# Patient Record
Sex: MALE | Race: WHITE
[De-identification: names, ages, dates, MRNs, and addresses within clinical notes are randomized per-mention and may not be internally consistent; named-entity substitution may affect disease eponyms.]

---

## 2018-11-27 ENCOUNTER — HOSPITAL ENCOUNTER (INPATIENT)
Dept: HOSPITAL 25 - ED | Age: 63
LOS: 1 days | Discharge: ANOTHER HOSPITAL | DRG: 191 | End: 2018-11-28
Attending: INTERNAL MEDICINE | Admitting: INTERNAL MEDICINE
Payer: COMMERCIAL

## 2018-11-27 DIAGNOSIS — F17.210: ICD-10-CM

## 2018-11-27 DIAGNOSIS — I11.9: Primary | ICD-10-CM

## 2018-11-27 DIAGNOSIS — I25.10: ICD-10-CM

## 2018-11-27 DIAGNOSIS — E87.6: ICD-10-CM

## 2018-11-27 DIAGNOSIS — Z91.14: ICD-10-CM

## 2018-11-27 DIAGNOSIS — Z82.49: ICD-10-CM

## 2018-11-27 DIAGNOSIS — Z95.5: ICD-10-CM

## 2018-11-27 DIAGNOSIS — I21.4: ICD-10-CM

## 2018-11-27 DIAGNOSIS — E78.5: ICD-10-CM

## 2018-11-27 DIAGNOSIS — R91.1: ICD-10-CM

## 2018-11-27 DIAGNOSIS — Z83.3: ICD-10-CM

## 2018-11-27 LAB
BASOPHILS # BLD AUTO: 0.1 10^3/UL (ref 0–0.2)
EOSINOPHIL # BLD AUTO: 0.2 10^3/UL (ref 0–0.6)
HCT VFR BLD AUTO: 45 % (ref 42–52)
HGB BLD-MCNC: 15.3 G/DL (ref 14–18)
INR PPP/BLD: 0.95 (ref 0.77–1.02)
LYMPHOCYTES # BLD AUTO: 1.2 10^3/UL (ref 1–4.8)
MCH RBC QN AUTO: 32 PG (ref 27–31)
MCHC RBC AUTO-ENTMCNC: 34 G/DL (ref 31–36)
MCV RBC AUTO: 94 FL (ref 80–94)
MONOCYTES # BLD AUTO: 0.5 10^3/UL (ref 0–0.8)
NEUTROPHILS # BLD AUTO: 7.7 10^3/UL (ref 1.5–7.7)
NRBC # BLD AUTO: 0 10^3/UL
NRBC BLD QL AUTO: 0
PLATELET # BLD AUTO: 202 10^3/UL (ref 150–450)
RBC # BLD AUTO: 4.74 10^6/UL (ref 4–5.4)
WBC # BLD AUTO: 9.7 10^3/UL (ref 3.5–10.8)

## 2018-11-27 PROCEDURE — 85379 FIBRIN DEGRADATION QUANT: CPT

## 2018-11-27 PROCEDURE — 80061 LIPID PANEL: CPT

## 2018-11-27 PROCEDURE — 80053 COMPREHEN METABOLIC PANEL: CPT

## 2018-11-27 PROCEDURE — 85730 THROMBOPLASTIN TIME PARTIAL: CPT

## 2018-11-27 PROCEDURE — 82550 ASSAY OF CK (CPK): CPT

## 2018-11-27 PROCEDURE — 87641 MR-STAPH DNA AMP PROBE: CPT

## 2018-11-27 PROCEDURE — 85025 COMPLETE CBC W/AUTO DIFF WBC: CPT

## 2018-11-27 PROCEDURE — 83735 ASSAY OF MAGNESIUM: CPT

## 2018-11-27 PROCEDURE — 84484 ASSAY OF TROPONIN QUANT: CPT

## 2018-11-27 PROCEDURE — 93005 ELECTROCARDIOGRAM TRACING: CPT

## 2018-11-27 PROCEDURE — 99157 MOD SED OTHER PHYS/QHP EA: CPT

## 2018-11-27 PROCEDURE — 71275 CT ANGIOGRAPHY CHEST: CPT

## 2018-11-27 PROCEDURE — 80048 BASIC METABOLIC PNL TOTAL CA: CPT

## 2018-11-27 PROCEDURE — 83880 ASSAY OF NATRIURETIC PEPTIDE: CPT

## 2018-11-27 PROCEDURE — 81003 URINALYSIS AUTO W/O SCOPE: CPT

## 2018-11-27 PROCEDURE — 82553 CREATINE MB FRACTION: CPT

## 2018-11-27 PROCEDURE — 71045 X-RAY EXAM CHEST 1 VIEW: CPT

## 2018-11-27 PROCEDURE — 99156 MOD SED OTH PHYS/QHP 5/>YRS: CPT

## 2018-11-27 PROCEDURE — 83605 ASSAY OF LACTIC ACID: CPT

## 2018-11-27 PROCEDURE — 93458 L HRT ARTERY/VENTRICLE ANGIO: CPT

## 2018-11-27 PROCEDURE — 85610 PROTHROMBIN TIME: CPT

## 2018-11-27 PROCEDURE — 84443 ASSAY THYROID STIM HORMONE: CPT

## 2018-11-27 PROCEDURE — 99285 EMERGENCY DEPT VISIT HI MDM: CPT

## 2018-11-27 PROCEDURE — 36415 COLL VENOUS BLD VENIPUNCTURE: CPT

## 2018-11-27 PROCEDURE — 93306 TTE W/DOPPLER COMPLETE: CPT

## 2018-11-27 RX ADMIN — METOPROLOL TARTRATE SCH MG: 25 TABLET, FILM COATED ORAL at 20:55

## 2018-11-27 RX ADMIN — HEPARIN SODIUM PRN UNITS: 5000 INJECTION INTRAVENOUS; SUBCUTANEOUS at 18:38

## 2018-11-27 NOTE — ED
HPI Chest Pain





- HPI Summary


HPI Summary: 


The pt is a 64 y/[facility] and LUE pain since 1730 hrs 

yesterday. 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 

without any difficulty. The constant stabbing pain is rated 6/10 in severity at 

its worst and is different from his previous MI because he did not have CP with 

his previous MI. He had his first MI at the age 45-46 years and had a stent 

placed. He notes nausea, CP, L rib pain, a fall 1 month ago (landing on the L 

side) but denies HA, dizziness, and fever. He took 1 baby ASA to mild relief. 

The pt reports a PMHx of HLD (stopped medications months ago) but denies a hx 

of HTN and DM.  FHx  of epileptic seizures (mother). PShx of the kidneys and 

shoulders. Vital signs while in room: HR 78 bpm, /98. Pt states he 

changed his last name when he got , and his name used to be Art Gomez Jr. 





Home medications: Lipitor and Plavix which he has not taken because he did not 

want to take medications





- History of Current Complaint


Time Seen by Provider: 11/27/18 12:48


Hx Obtained From: Patient


Onset/Duration: Started Days Ago - 1 day, Still Present


Timing: Constant, Lasting Days


Initial Severity: Moderate - 6/10


Current Severity: Mild - CP=2/10, LUE pain= 3/10


Pain Intensity: 2


Pain Scale Used: 0-10 Numeric


Chest Pain Location: Discrete at: - L side


Chest Pain Radiates: Yes


Chest Pain Radiates To:: Arm - LUE


Character: Sharp/Stabbing


Aggravating Factor(s): Nothing


Alleviating Factor(s): OTC Meds - 1 baby ASA


Associated Signs and Symptoms: Positive: Negative - HA, dizziness, Chest Pain, 

Nausea, Other: - Fall 1 month ago.  Negative: Headaches, Fever





- Risk Factors


AMI/ACS Risk Factors: Myocardial Infarction, Smoking, Dyslipidemia





- Allergy/Home Medications


Allergies/Adverse Reactions: 


 Allergies











Allergy/AdvReac Type Severity Reaction Status Date / Time


 


No Known Allergies Allergy   Verified 11/27/18 12:57











Home Medications: 


 Home Medications





NK [No Home Medications Reported]  11/27/18 [History Confirmed 11/27/18]











PMH/Surg Hx/FS Hx/Imm Hx


Previously Healthy: No


Endocrine/Hematology History: 


   Denies: Hx Diabetes


Cardiovascular History: Reports: Hx Coronary Artery Disease, Hx 

Hypercholesterolemia, Hx Myocardial Infarction


   Denies: Hx Hypertension


Respiratory History: 


   Denies: Hx Asthma


 History: Reports: Other  Problems/Disorders - "kidney surgery"


Sensory History: 


   Denies: Hx Deafness





- Cancer History


Cancer Type, Location and Year: None reported





- Surgical History


Surgery Procedure, Year, and Place: Kidney and shoulder surgery, cardiac stent


Infectious Disease History: No





- Family History


Known Family History: Positive: Seizure Disorder - Mother 





- Social History


Occupation: Employed Full-time


Lives: With Family


Alcohol Use: Occasionally


Hx Substance Use: No


Smoking Status (MU): Heavy Every Day Tobacco Smoker





Review of Systems


Constitutional: Negative - Dizziness, Other - Positive: fall -1 month ago


Negative: Fever


Positive: Chest Pain


Respiratory: Negative


Positive: Nausea


Musculoskeletal: Other - Positive: L rib pain 


Skin: Negative


Negative: Headache


Psychological: Normal


All Other Systems Reviewed And Are Negative: Yes





Physical Exam





- Summary


Physical Exam Summary: 


Appearance: Ill-appearing, moderate pain distress, well-nourished


Skin: Warm, color reflects adequate perfusion, dry


Head: Normal Head/Face inspection, atraumatic


Eyes: Conjunctiva clear


ENT: Normal inspection


Neck: Supple, no nodes, no JVD


Respiratory: scattered wheezes, no respiratory distress, reproducible CP on his 

left anterior 10th rib


Cardio: RRR, No murmur, pulses normal, brisk capillary refill


Abdomen: Soft, nontender, no masses, nondistended


Bowel sounds: Present 


Musculoskeletal: Strength Intact/ROM intact, no calf tenderness, no edema.


Psychological: Normal


Neuro: Alert, muscle tone normal, no focal deficit


Triage Information Reviewed: Yes


Vital Signs On Initial Exam: 





 Initial Vital Signs











Pulse  79   11/27/18 12:49


 


Resp  23   11/27/18 12:49


 


Pulse Ox  100   11/27/18 12:49











Vital Signs Reviewed: Yes





Diagnostics





- Laboratory


Result Diagrams: 


 11/27/18 12:59





 11/27/18 12:59


Lab Statement: Any lab studies that have been ordered have been reviewed, and 

results considered in the medical decision making process.





- Radiology


  ** CXR


Radiology Interpretation Completed By: Radiologist - IMPRESSION: NO ACTIVE 

CARDIOPULMONARY DISEASE.The ED physician reviewed this radiology report.





- CT


  ** Chest/thorax CTA


CT Interpretation Completed By: Radiologist - IMPRESSION:  1.  NO PULMONARY 

ARTERIAL FILLING DEFECT TO SUGGEST PULMONARY EMBOLISM. 2.  4.2 CM RIGHT ADRENAL 

NODULE. IN THE ABSENCE OF A HISTORY OF MALIGNANCY, CONSIDER SURGICAL 

CONSULTATION FOR THE PRESENCE OF AN INCIDENTAL ADRENAL MASS GREATER THAN 4 CM 

IN SIZE. 3.  0.3 CM PLEURAL-BASED NODULE OF THE RIGHT MIDDLE LOBE. THE 

RECOMMENDATIONS FOR FOLLOWUP AND MANAGEMENT OF AN INCIDENTALLY DETECTED 

PULMONARY NODULE LESS THAN 6 MM IN SIZE, IN A PATIENT WITHOUT A HISTORY OF 

MALIGNANCY, INCLUDE NO FOLLOWUP FOR A LOW-RISK PATIENT OR OPTIONAL FOLLOWUP CT 

IN 12 MONTHS FOR A HIGH RISK PATIENT. 4.  CORONARY ARTERY DISEASE. The ED 

physician reviewed this radiology report.





- EKG


  ** 12:42


Cardiac Rate: NL - 74 bpm


EKG Rhythm: Sinus Rhythm


Ectopy: None


EKG Comparison: Other


Summary of EKG Findings: Normal IVCT, Normal QTC, Normal axis, ST depression in

  leads V4-V6.  No prior EKG to compare with.





  ** 14:13


Cardiac Rate: NL - 75 bpm


EKG Rhythm: Sinus Rhythm


Ectopy: None


Summary of EKG Findings: Normal IVCT, Normal QTC, Normal axis, ST segments have 

normalized compared to previous EKG performed today after NTG SL x1 and pain 

relieved.





Re-Evaluation





- Re-Evaluation


  ** First Eval


Re-Evaluation Time: 13:49


Change: Improved - The patient is pain free. His wife, daughter and 

granddaughter are with him at bedside. He agrees for further testing with a CTA.





  ** Second Eval


Re-Evaluation Time: 15:45


Change: Improved - Pt remains pain free. I discussed plan to admit with the pt 

and his family.





Chest Pain Course/Dx





- Course


Course Of Treatment: A 63 year-old M with hx MI and stent at age 48 presents to 

the ED with a CC of CP and LUE pain since 1730 hrs yesterday after lifting 

heavy scaffolding at work. Pt is a heavy smoker and discontinued lipitor and 

plaviix on his own because he did not want to take medication. Pt does not have 

a cardiologist.  A physical exam revealed scattered wheezes and reproducible 

pain in his 10th L rib. A CXR is unremarkable. A chest/thorax CTA reveals no 

pulmonary arterial filling defect to suggest PE, a 4.2 cm adrenal nodule, a 0.3 

cm pleural-based nodule in the R middle lobe and coronary artery disease. 

Initial EKG showed SR with 1-2 mm ST depression in leads V4-V6. A second EKG 

after one NTG SL which relieved CP shows normalized ST segements.  Pt's initial 

troponin was 0.01, 3 hr troponin was 0.65. In the ED course, pt was given ASA 

243 mg PO (since pt had taken 1 baby ASA prior to admission) and NTG 0.4 mg SL 

which improved the symptoms. I discussed the care of the pt with Dr. Harper 

hospitalist who agreed to admit the pt. Patient will be admitted with a final 

Dx of CP, non STEMI. Pt is agreeable with this plan. Allergies noted.





- Chest Pain


Differential Diagnosis/HQI/PQRI: Acute MI, ACS, Angina, Pulmonary Embolism





- Diagnoses


Provider Diagnoses: 


 Chest pain, Non-STEMI (non-ST elevated myocardial infarction)








- Provider Notifications


Discussed Care Of Patient With: Sheeba Harper - Hospitalist 


Time Discussed With Above Provider: 15:35


Instructed by Provider To: Admit As Inpatient





- Critical Care Time


Critical Care Time: 30-74 min - 30 mins





Discharge





- Sign-Out/Discharge


Documenting (check all that apply): Patient Departure - Admit 





- Discharge Plan


Condition: Stable


Disposition: ADMITTED TO [facility]





- Billing Disposition and Condition


Condition: STABLE


Disposition: Admitted to [facility]





- Attestation Statements


Document Initiated by Scribe: Yes


Documenting Scribe: Dawn Caraballo


Provider For Whom José Antonioibe is Documenting (Include Credential): Dr. Linda White MD 


Scribe Attestation: 


Dawn CERVANTES, scribed for Dr. Linda White MD  on 11/28/18 at 0158. 


Scribe Documentation Reviewed: Yes


Provider Attestation: 


The documentation as recorded by the Dawn branch accurately 

reflects the service I personally performed and the decisions made by me, Dr. Linda White MD 


Status of Scribe Document: Viewed

## 2018-11-28 VITALS — SYSTOLIC BLOOD PRESSURE: 96 MMHG | DIASTOLIC BLOOD PRESSURE: 60 MMHG

## 2018-11-28 LAB
BASOPHILS # BLD AUTO: 0 10^3/UL (ref 0–0.2)
EOSINOPHIL # BLD AUTO: 0.2 10^3/UL (ref 0–0.6)
HCT VFR BLD AUTO: 43 % (ref 42–52)
HGB BLD-MCNC: 14.7 G/DL (ref 14–18)
LYMPHOCYTES # BLD AUTO: 1.5 10^3/UL (ref 1–4.8)
MCH RBC QN AUTO: 32 PG (ref 27–31)
MCHC RBC AUTO-ENTMCNC: 35 G/DL (ref 31–36)
MCV RBC AUTO: 94 FL (ref 80–94)
MONOCYTES # BLD AUTO: 0.6 10^3/UL (ref 0–0.8)
NEUTROPHILS # BLD AUTO: 6.5 10^3/UL (ref 1.5–7.7)
NRBC # BLD AUTO: 0 10^3/UL
NRBC BLD QL AUTO: 0
PLATELET # BLD AUTO: 189 10^3/UL (ref 150–450)
RBC # BLD AUTO: 4.52 10^6/UL (ref 4–5.4)
WBC # BLD AUTO: 8.9 10^3/UL (ref 3.5–10.8)

## 2018-11-28 PROCEDURE — B215YZZ FLUOROSCOPY OF LEFT HEART USING OTHER CONTRAST: ICD-10-PCS | Performed by: INTERNAL MEDICINE

## 2018-11-28 PROCEDURE — 4A023N7 MEASUREMENT OF CARDIAC SAMPLING AND PRESSURE, LEFT HEART, PERCUTANEOUS APPROACH: ICD-10-PCS | Performed by: INTERNAL MEDICINE

## 2018-11-28 PROCEDURE — B211YZZ FLUOROSCOPY OF MULTIPLE CORONARY ARTERIES USING OTHER CONTRAST: ICD-10-PCS | Performed by: INTERNAL MEDICINE

## 2018-11-28 RX ADMIN — HEPARIN SODIUM PRN UNITS: 5000 INJECTION INTRAVENOUS; SUBCUTANEOUS at 00:58

## 2018-11-28 RX ADMIN — METOPROLOL TARTRATE SCH MG: 25 TABLET, FILM COATED ORAL at 08:44

## 2018-11-28 NOTE — HP
CC:  Dr. Skiff

 

HISTORY AND PHYSICAL:

 

ADDENDUM:

 

ASSESSMENT AND PLAN:

1.  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 
units at 29.  At this point, surgical evaluation is recommended.  Due to that 
that it was incidentally found and the patient has no symptoms of adrenal 
adenoma, the patient is going to be referred to Dr. Mckinley, the surgical 
endocrinology specialist at discharge.

2.  In regards to the patient's 3-mm lung nodule, followup recommended with CAT 
scan in 3 to 6 months to document stability.

 

 

 

[id number]/[facility] #: [id number]

[facility] Center[facility]IANA

## 2018-11-28 NOTE — TRS
CC:  Dr. Story; Dr. Conklin; Dr. Elder; Dr. Skiff *

 

DATE OF ADMISSION:  11/27/2018.

 

DATE OF ANTICIPATED TRANSFER:  11/28/2018.

 

PRIMARY CARE PHYSICIAN:  Dr. Skiff.

 

DISPOSITION:  The patient is being transferred to [facility] in 
[place], [place] under the care of Dr. Elder.

 

REASON FOR TRANSFER:  Severe three vessel carotid disease.  The patient is 
going there for consideration of coronary artery bypass grafting.

 

MEDICATIONS AT THE TIME OF TRANSFER:

1.  Normal saline 75 ml/hour.

2.  Metoprolol Tartrate 25 mg p.o. every 6 hours.

3.  Aspirin 325 mg daily.

4.  Nitroglycerin 0.4 mg on a prn basis for chest pain.

 

LABORATORY DATA:  The most recent laboratory data from 11/28/2018 showed sodium 
138, potassium 3.8, chloride 107, carbon dioxide 25, BUN 14, creatinine 0.96. 
Troponin was obtained at 6:00 a.m. on 11/28/2018 and was 5.1.  The patient's 
fasting liver profile shows triglycerides of 120, cholesterol 227, , HDL 
40. TSH was noted to be 4.4 on admission.  CBC:  WBC 8.9, hemoglobin 14.7, 
hematocrit 43, platelets 189.

 

PROCEDURES PERFORMED:  Cardiac catheterization performing in the morning on 11/
28/2018 by Dr. Story which as per verbal report from Dr. Story showed severe 
three vessel coronary artery disease 



HOSPITALIZATION COURSE:  Art Werner is a 63-year-old male with a history of 
smoking over a pack a day who had coronary artery disease with stenting in 2002 
and had not been taking his medications, including Plavix, aspirin, and Lipitor 
for "a while" who presented complaining of exertional chest pain.  His troponin 
continues to increase throughout his hospital stay.  He had developed chest 
pain when he was still observed in the emergency room and it was recurrent.  
His EKG showed minimal ST elevation and negative T-waves which was new.  On the 
morning on 11/28/2018, his T-waves progressed to be more negative.  Dr. Story 
saw the patient in consultation and performed cardiac catheterization which 
showed severe three vessel coronary disease.  The patient wished to be 
transferred back to [facility] where he received his stents in 2002 
for further management of likely cardiothoracic surgery.

 

PHYSICAL EXAMINATION AT DISCHARGE:  Noted in the progress notes.

 

Please note that Dr. Elder from [facility] was kind enough to 
accept the patient in consideration for cardiothoracic surgery at [facility].

 

Please note, this is a short summary of the patient's hospitalization.  Please 
refer to further medical records for details.

 

Approximately 40 minutes were spent on this patient's discharge.

 

 [id number]/[facility] #: [id number]

ADONIS

## 2018-11-28 NOTE — PN
Subjective


Date of Service: 11/28/18


Interval History: 





Pt is preparing for cath today. Denies CP





Objective


Active Medications: 








Acetaminophen (Tylenol Tab*)  650 mg PO Q4H PRN


   PRN Reason: FEVER/PAIN


Aspirin (Ecotrin Ec Tab*)  325 mg PO DAILY [facility]


   Last Admin: 11/28/18 08:44 Dose:  325 mg


Sodium Chloride (Ns 0.9% 1000 Ml*)  1,000 mls @ 100 mls/hr IV .per rate [facility]


Metoprolol Tartrate (Lopressor Tab*)  25 mg PO Q12HR [facility]


   Last Admin: 11/28/18 08:44 Dose:  25 mg


Morphine Sulfate (Morphine Vial*)  2 mg IV Q4H PRN


   PRN Reason: PAIN - MILD








 Vital Signs - 8 hr











  11/28/18 11/28/18 11/28/18





  03:00 03:01 04:00


 


Temperature   98.4 F


 


Pulse Rate 56 55 64


 


Respiratory 15 15 21





Rate   


 


Blood Pressure 110/74  122/94





(mmHg)   


 


O2 Sat by Pulse 94 95 98





Oximetry   














  11/28/18 11/28/18 11/28/18





  04:01 05:00 05:01


 


Temperature   


 


Pulse Rate 61 57 56


 


Respiratory 19 16 14





Rate   


 


Blood Pressure  106/70 





(mmHg)   


 


O2 Sat by Pulse 97 95 95





Oximetry   














  11/28/18 11/28/18 11/28/18





  05:50 06:00 06:01


 


Temperature   


 


Pulse Rate  67 71


 


Respiratory 15 17 16





Rate   


 


Blood Pressure  110/67 





(mmHg)   


 


O2 Sat by Pulse  95 97





Oximetry   














  11/28/18 11/28/18 11/28/18





  07:00 07:01 08:00


 


Temperature   


 


Pulse Rate 53 56 53


 


Respiratory 15 14 17





Rate   


 


Blood Pressure 113/75  109/71





(mmHg)   


 


O2 Sat by Pulse 96 96 95





Oximetry   














  11/28/18 11/28/18 11/28/18





  08:01 08:19 09:00


 


Temperature  97.6 F 


 


Pulse Rate 57  69


 


Respiratory 17  23





Rate   


 


Blood Pressure   134/83





(mmHg)   


 


O2 Sat by Pulse 95  96





Oximetry   














  11/28/18 11/28/18 11/28/18





  09:01 10:00 10:01


 


Temperature   


 


Pulse Rate 63 63 65


 


Respiratory 21 18 23





Rate   


 


Blood Pressure  143/91 





(mmHg)   


 


O2 Sat by Pulse 95 96 97





Oximetry   











Oxygen Devices in Use Now: None


Appearance: 62 yo M in nAD, aAOx3


Eyes: No Scleral Icterus, PERRLA


Ears/Nose/Mouth/Throat: NL Teeth, Lips, Gums, Mucous Membranes Moist


Neck: NL Appearance and Movements; NL JVP, Trachea Midline


Respiratory: Symmetrical Chest Expansion and Respiratory Effort, Clear to 

Auscultation


Cardiovascular: NL Sounds; No Murmurs; No JVD, RRR


Abdominal: NL Sounds; No Tenderness; No Distention


Lymphatic: No Cervical Adenopathy


Extremities: No Edema, No Clubbing, Cyanosis


Skin: No Rash or Ulcers, No Nodules or Sclerosis


Neurological: Alert and Oriented x 3, NL Muscle Strength and Tone


Result Diagrams: 


 11/28/18 06:00





 11/28/18 06:00


Microbiology and Other Data: 


 Microbiology











 11/27/18 18:35 Nasal Screen MRSA (PCR) - Final





 Nasal    Mrsa Not Detected














Assess/Plan/Problems-Billing


Assessment: 62 yo M with h/o CAD(2 stents in 2002 at [facility]) presents with NSTEMI











- Patient Problems


(1) NSTEMI (non-ST elevated myocardial infarction)


Comment: heparin gtt off for cath. trop still has not peaked, last one >5


EKG shows further inversion of T in V1-V3


CP free


cont ASA, BB, statin   





(2) Dyslipidemia


Comment: 


Lipitor started   





(3) Tobacco abuse counseling


Comment: counseled 2 min today at 4 min at admission   





(4) DVT prophylaxis


Comment: was on heprain gtt, held for cath

## 2018-11-28 NOTE — CONS
CC:  Hospital Service, Dr. Harper; Dr. Conklin; Dr. Skiff *

 

CARDIOLOGY CONSULT:

 

DATE OF CONSULT:  11/27/18

 

PRIMARY CARE PHYSICIAN:  Dr. Skiff.

 

HISTORY OF PRESENT ILLNESS:  The patient is a 63-year-old male patient with 
known history of coronary artery disease, history of myocardial infarction and 
stents back in 2002 that was done at [facility] in [place].  Full 
detailed information is not immediately available to me.  The patient is 
noncompliant with his medications.  He stopped his cardiac medications more 
than 6 months ago.  He continues to smoke.  He had history of hyperlipidemia, 
hypertension, and current tobacco consumption.  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 
labor work, about 1 o'clock when he started to have chest pain and arm pain and 
he felt nauseous.  He had no orthopnea, no dizziness, no syncope, no fever, no 
chills, no skin rash, no tremors, no hematochezia, no palpitations appreciated.
  He called his associate, who drove him to the emergency room and in the 
emergency room, he had initial troponin that was 0.01 that was about 12:59 this 
afternoon.  His BNP was 21.  The patient after sublingual nitroglycerin became 
chest pain-free.  He had a troponin second one was at 4:16 this afternoon and 
it came up to 0.65.  His EKGs, no definite ST elevation. He had a borderline J-
point elevation in V2 ,V2 and V3 and borderline in lead I and lead II and III.  
After he came back from the CT scan, he had to go to the bathroom and he had 
brief episode of chest pain.  He had a followup EKG then as well at 1717 showed 
he has T-wave inversions in V1, V2, V3 and V4 and because of that cardiology 
consult was further requested.  He is chest pain-free.  He has been chest pain-
free since then.  He gives no orthopnea, no PND, no swelling in the lower 
extremities, no history of congestive heart failure, no diabetes according to 
the patient.  He is comfortable at the present time.  He will be admitted the 
intensive care unit.

 

PAST MEDICAL HISTORY:  Includes history of myocardial infarction, history of 
coronary artery disease, history of stents put in 2002 at [facility] in [place].

 

PAST SURGICAL HISTORY:  History in the kidney on the left side.  He is not 
aware of the details.  He was a child.

 

MEDICATIONS:  As an outpatient, he was supposed to be on:

1.  Lisinopril.

2.  Lipitor.

3.  Aspirin.

4.  Plavix.

 

He is not on any of them.

 

ALLERGIES:  He has no known drug allergies.

 

FAMILY HISTORY:  There is no family history of premature coronary artery 
disease.

 

SOCIAL HISTORY:  He lives with his wife.  He has 3 kids, doing well.

 

REVIEW OF SYSTEMS:  His review of all other systems essentially is negative.

 

PHYSICAL EXAM:  He is awake, alert, and oriented.  He is not in any acute 
distress. His vitals revealed blood pressure 117/83; his pulse is 68, he is in 
sinus rhythm; temperature 98; and respiratory rate is 22.  Head and Neck Exam:  
Normocephalic, atraumatic head.  Ears, nose, and throat essentially benign.  
Neck is supple.  JVP is not elevated.  No carotid bruits.  No masses in the 
neck are appreciated. Chest:  Clear to auscultation.  No rales.  No wheeze.  No 
added sounds appreciated. Heart:  Normal S1, S2.  No added sounds.  No gallops, 
no rubs.  Abdomen:  Benign, soft.  Positive bowel sounds.  Extremities:  No 
edema, no cyanosis, and no clubbing.  Skin exam is normal.  Psych:  Normal 
affect and mood.  CNS:  No focal deficit is appreciated.

 

DIAGNOSTIC STUDIES/LAB DATA:  His EKGs as described.

 

His labs showed the following:  Sodium 138, potassium 3.5, chloride 103, carbon 
dioxide 25, BUN 15, creatinine 1.06.  LFTs are normal.  BNP 21.  TSH 4.49.  
White blood cells 9.7, hemoglobin 15.3, hematocrit 45, and platelets 202.

 

His CTA of the chest showed no PE appreciated.

 

IMPRESSION:  The patient is a 63-year-old male patient with:

1.  Ruled in for myocardial infarction.  No definite ST elevation by his EKG, 
but definitely he has dynamic EKG changes and known history of coronary artery 
disease.

2.  Known history of coronary artery disease, history of myocardial infarction 
and stents in 2002 at [facility] in [place].  Full detailed 
information not immediately available.

3.  Systemic arterial hypertension.

4.  Hyperlipidemia.

5.  Abnormal EKG as described.

6.  Hypokalemia.

7.  The patient is noncompliant.

 

PLAN:  The patient is currently chest pain-free.  I discussed him with Dr. Harper 
from the hospitalist service.  He will be admitted to the intensive care unit.  
He will be started on aspirin, heparin as per protocol, statin, and also 
lisinopril and Lopressor beta-blocker.  He is to be kept n.p.o. after midnight 
for cardiac catheterization in the morning to evaluate his coronary anatomy as 
well as the status of his stents.  If he becomes hemodynamically unstable or 
becomes with more recurrent symptoms of chest pain or significant EKG 
abnormalities, then he might be taken to the cardiac cath lab sooner rather 
than later.  I have discussed him via phone with Dr. Story, the interventional 
cardiologist on-call and discussed the plan as well.  I answered all the patient
's concerns and questions up to his satisfaction and his family.

 

TIME SPENT:  More than half of at least 65 plus minutes was face-to-face in the 
education and counseling mode.

 

 [id number]/[facility] #: [id number]

ADONIS

## 2018-11-28 NOTE — HP
*** ADDENDUM NOW INCLUDED ON THIS REPORT ***



CC:  Dr. Conklin; Dr. Skiff *

 

HISTORY AND PHYSICAL:

 

DATE OF ADMISSION:  18.

 

PRIMARY CARE PROVIDER:  Dr. Skiff.

 

CHIEF COMPLAINT:  Chest pain.

 

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 
in  presented to our facility with chest pain, was diagnosed with MI, and 
was transferred to [facility] where he had cardiac catheterization 
and 2 stents placed.  Since then, he is supposed to be on aspirin and Plavix as 
well as Lipitor, but he had not been taking it for "a while."  He smokes 2 
packs per day of cigarettes.  He presented to the hospital complaining of 
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  The chest 
pain lasted approximately 2 hours and resolved with treatment with 
nitroglycerin in the emergency department. When the patient was walking off a 
CAT scan table, he developed chest pain again. After that episode, his repeat 
EKG approximately 20 minutes after the repeated episode of chest pain, showed 
mild ST elevation in his leads V1 to V3.  At that point, the patient was 
already chest pain free.  At that point, I was also evaluating the patient for 
admission.  He is going to be admitted to the intensive care unit with a 
diagnosis of MI.

 

PAST MEDICAL HISTORY:

1.  History of coronary artery disease, status post 2 stents in  at [facility].

2.  History of dyslipidemia.

3.  Tobacco abuse.

 

MEDICATIONS:  None.

 

ALLERGIES:  No known drug allergies.

 

FAMILY HISTORY:  Positive for father with diabetes, hypertension, and bypass in 
his 70s.  Mother with history of seizure.  Brother  at the age of 41 of 
heart attack.

 

SOCIAL HISTORY:  The patient smokes 2 packs per day.  He has been doing so most 
of his adult life.  He drinks occasional beer.  He denies any drug use.  He 
lives with his wife Mrs. Rut Werner.  He changed this last time to his wife
's name when they .  His wife also would be the surrogate.

 

REVIEW OF SYSTEMS:  The patient stated that his exercise tolerance up to the 
episode of chest pain today had been excellent.  He had been "dragging deer 
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

 

All the remaining 12 systems were reviewed with the patient and were otherwise 
negative.

 

                               PHYSICAL EXAMINATION

 

GENERAL:  The patient is a pleasant 63-year-old male, who is in no acute 
distress. Alert, awake, and oriented x3.

 

VITAL SIGNS:  Blood pressure of 133/78, heart rate of 78 and regular, 
respiratory rate 19, oxygen saturation 97% on room air, temperature of 98.1.

 

HEENT:  Head:  Atraumatic, normocephalic.  Eyes:  Pupils are equal, reactive to 
light and accommodation.  Oropharynx is clear.  Mucosa moist.

 

NECK:  Supple.  No JVD.  No bruits bilaterally.

 

RESPIRATORY:  Clear to auscultation bilaterally.

 

CARDIOVASCULAR:  Regular rate and rhythm.  No murmur.

 

ABDOMEN:  Soft, nontender.  Bowel sounds are present in all 4 quadrants.

 

EXTREMITIES:  There is no edema.  Pulses are +2 bilaterally.  There is no 
clubbing and no cyanosis.

 

NEUROLOGIC:  On neuro evaluation, speech is clear.  Cranial nerves II through 
XII are grossly intact.  Motor strength is 5/5 bilaterally.

 

SKIN:  On evaluation of the skin, no ecchymotic areas or rashes noted.

 

 DIAGNOSTIC STUDIES/LAB DATA:  The patient's initial troponin was 0.01, second 
troponin was 0.65, the troponins were obtained 6 hours apart.

 

Sodium was 138, potassium 2.5, chloride 103, carbon dioxide 25, BUN 15, 
creatinine 1.06.  Liver function tests unremarkable.  TSH of 4.4.

 

CBC:  White blood cell count of 9.7, hemoglobin 15.3, hematocrit 45, and 
platelets of 202.

 

The patient's portable chest x-ray, impression, "No active cardiopulmonary 
disease."

 

CT angiogram of the chest, impression, "No pulmonary arterial filling defects 
to suggest pulmonary embolism.  A 4.2-cm right adrenal nodule in the absence of 
history of malignancy consider surgical consult for the presence of an 
incidental adrenal mass greater than 4 cm in size, 0.3 cm subpleural  nodule of 
the right middle lobe."

 

The patient's initial EKG showed normal sinus rhythm with a heart rate of 74 
beats per minute with mild ST depression in the lateral leads.  The recent EKG 
showed minimal ST elevation in leads V1 to V3, below 1 mm with negative T waves 
in lead V2 and V3, which is new comparing from the EKG obtained 6 hours prior.  
The ST changes in lateral leads appeared to have resolved by that point.

 

ASSESSMENT AND PLAN:

1.  Unstable angina/non-ST elevation myocardial infarction.  The patient has 
minimal ST changes currently, but he is chest pain free.  I discussed the case 
with Dr. Conklin, the cardiologist on call.  The patient is going to be given 
beta blocker, heparin drip, and nitroglycerin paste and he has been placed in 
the intensive care unit for monitoring.  He is going to be placed n.p.o. for 
likely cardiac catheterization in the morning.  I will obtain transthoracic 
echocardiogram.  He is also going to be placed on Lipitor on a daily basis.  I 
will obtain fasting lipid profile to evaluate it further in the morning.

2.  For DVT prophylaxis, the patient is going to be placed on a heparin drip as 
mentioned above.

3.  The patient's code status is full and his surrogate is his wife.

 

TIME SPENT:  Approximately, 72 minutes was spent on evaluation and treatment of 
this patient in the emergency department, more than half that time was spent 
face- to-face with the patient.



ADDENDUM:

 

ASSESSMENT AND PLAN:

1.  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 
units at 29.  At this point, surgical evaluation is recommended.  Due to that 
that it was incidentally found and the patient has no symptoms of adrenal 
adenoma, the patient is going to be referred to Dr. Mckinley, the surgical 
endocrinology specialist at discharge.

2.  In regards to the patient's 3-mm lung nodule, followup recommended with CAT 
scan in 3 to 6 months to document stability.



 

[id number]/[facility] #: [id number]

[id number]/[facility] #: [id number]

ADONIS

## 2018-11-29 NOTE — CATH
CC:  Dr. Skiff; Dr. Conklin *

 

CATH REPORT:

 

DATE OF PROCEDURE: 11/28/18 - ROOM #ICU-03

 

PRIMARY CARE PROVIDER:  Dr. Skiff in [place].

 

CARDIOLOGIST:  Dr. Conklin.

 

PROCEDURES:

1.  Right radial artery access.

2.  Bilateral selective coronary cineangiography.

3.  Left heart catheterization.

4.  Left ventriculography.

 

HISTORY:  A 63-year-old male with prior infarct, 12/08/02, at which time 
catheterization in [place] demonstrated mid RCA occlusion with left to right 
collaterals, high grade circumflex stenosis, which was stented with a 2.75 x 15 
PENTA stent, with a more distal overlapping 2.5 x 13 stent.  The ramus was 
occluded, the presumed culprit, was treated with angioplasty, 2.5 x 15 mm.  Per 
report at that time, he had a 70% diagonal stenosis and 50% LAD stenosis.  He 
was lost to followup, about 6 months ago stopped his medical regimen, which he 
recognizes was foolish.  He presented yesterday with a non-ST elevation infarct 
with 2 hours of chest pain, subsequent inversion of his precordial T waves, and 
a troponin elevation to 5.18, although not yet peaked.  He has been 
asymptomatic here on medical therapy, was referred for coronary angiography.

 

PROCEDURE ACCESS:  Right radial artery sheath 6F slender.

 

MEDICATIONS:

1.  Subcu lidocaine.

2.  IV Versed.

3.  IV fentanyl.

4.  Heparin 3000 units.

5.  Verapamil 3 mg.

6.  Nitroglycerin 300 mcg IA.

 

DIAGNOSTIC CATHETERS:  5F TIG4, 5F R4, 5F pigtail.

 

HEMODYNAMICS:  LV 97/11, no aortic valve gradient.

 

Of note, he had an episode of asymptomatic 8-beat run of monomorphic 
nonsustained VT prior to angiography.

 

ANGIOGRAPHY: Left main:  The left main has gradual distal taper to 
approximately 30% to 40%.

 

LAD:  The LAD is large, calcified, has proximal luminal irregularity, mid 
portion has a 90% stenosis, distally the LAD has LOGAN 2 flow.  There is a large 
proximal diagonal, which has proximal tubular 60% stenosis.  There are well 
developed left to right collaterals, primarily from the circumflex.

 

Circumflex:  The circumflex is not dominant, is moderate in size with a very 
large ramus, which has a 70% stenosis, the AV groove of circumflex supplies no 
significant branches, but does provide transatrial collaterals to the distal 
right coronary.

 

RCA:  The RCA is occluded a centimeter from the origin, distal RCA fills 
through well established collaterals with filling of the large PDA and 
posterolateral, which are in continuity.  The RCA is heavily calcified.

 

LV Gram:  Estimated LVEF 45%, there is mild anterolateral hypokinesis, and mild 
distal inferior apical hypokinesis, no mitral regurgitation.

 

CONCLUSION:

1.  Three-vessel disease with mildly impaired left ventricular systolic function
, non-ST elevation infarct presentation.

2.  Normal left-sided hemodynamics.

3.  Successful right radial artery access.

4.  He will be referred for bypass grafting.

 

 [id number]/[facility] #: [id number]

ADONIS